# Patient Record
Sex: FEMALE | Race: WHITE
[De-identification: names, ages, dates, MRNs, and addresses within clinical notes are randomized per-mention and may not be internally consistent; named-entity substitution may affect disease eponyms.]

---

## 2017-09-23 ENCOUNTER — HOSPITAL ENCOUNTER (EMERGENCY)
Dept: HOSPITAL 25 - UCCORT | Age: 72
Discharge: LEFT BEFORE DISCHARGE | End: 2017-09-23
Payer: MEDICARE

## 2017-09-23 VITALS — SYSTOLIC BLOOD PRESSURE: 153 MMHG | DIASTOLIC BLOOD PRESSURE: 64 MMHG

## 2017-09-23 DIAGNOSIS — Z99.81: ICD-10-CM

## 2017-09-23 DIAGNOSIS — R06.02: ICD-10-CM

## 2017-09-23 DIAGNOSIS — Z72.0: ICD-10-CM

## 2017-09-23 DIAGNOSIS — J44.9: Primary | ICD-10-CM

## 2017-09-23 DIAGNOSIS — R07.9: ICD-10-CM

## 2017-09-23 PROCEDURE — G0463 HOSPITAL OUTPT CLINIC VISIT: HCPCS

## 2017-09-23 PROCEDURE — 99212 OFFICE O/P EST SF 10 MIN: CPT

## 2017-09-23 NOTE — ED
Shortness of Breath





- HPI Summary


HPI Summary: 





72 yr old female with the chest heaviness and shortness of breath.  Onset of 

symptoms abruptly yesterday.  She states she has had heaviness and feels SOB.  

She does not feel this is like her COPD exacerbation.  the patient denies fever 

or chills.  The patient denies dizziness.  Her symptoms are moderate.  She has 

been wearing oxygen at home and her symptoms are not any better. 





- History of Current Complaint


Chief Complaint: UCRespiratory


Time Seen by Provider: 09/23/17 12:39





- Allergy/Home Medications


Allergies/Adverse Reactions: 


 Allergies











Allergy/AdvReac Type Severity Reaction Status Date / Time


 


Bupropion [From Wellbutrin] Allergy  anaphylaxis Verified 09/23/17 12:38











Home Medications: 


 Home Medications





Oxygen    09/23/17 [History]


Tiotropium CAP.INH* [Spiriva CAP.INH*] 1 cap DAILY 09/23/17 [History Confirmed 

09/23/17]











PMH/Surg Hx/FS Hx/Imm Hx


Previously Healthy: Yes


Respiratory History: Reports: Hx Chronic Obstructive Pulmonary Disease (COPD)





- Cancer History


Hx Chemotherapy: No


Hx Radiation Therapy: No





- Surgical History


Surgery Procedure, Year, and Place: Complete Hysterectomy 1979, Lumbar -1998, 

Lithotripsy 5/28/14


Infectious Disease History: Yes


Infectious Disease History: Reports: Hx Hepatitis - hepatitis C


   Denies: Traveled Outside the [place] in Last 30 Days





- Family History


Known Family History: Positive: Hypertension





- Social History


Alcohol Use: None


Substance Use Type: Reports: None


Hx Tobacco Use: Yes


Smoking Status (MU): Current Some Day Smoker


Type: Cigarettes


Amount Used/How Often: 1 ppd


Have You Smoked in the Last Year: Yes





Review of Systems


Negative: Fever, Chills


Positive: Chest Pain


Positive: Shortness Of Breath


All Other Systems Reviewed And Are Negative: Yes





Physical Exam


Triage Information Reviewed: Yes


Vital Signs On Initial Exam: 


 Initial Vitals











Temp Pulse Resp BP Pulse Ox


 


 99 F   78   26   153/64   98 


 


 09/23/17 12:40  09/23/17 12:40  09/23/17 12:40  09/23/17 12:40  09/23/17 12:40











Vital Signs Reviewed: Yes


Appearance: Positive: Well-Appearing, No Pain Distress


Skin: Positive: Warm, Skin Color Reflects Adequate Perfusion


Eyes: Positive: EOMI, ADAN


ENT: Positive: Normal ENT inspection


Neck: Positive: Supple, Nontender


Respiratory/Lung Sounds: Positive: Clear to Auscultation, Breath Sounds Present


Cardiovascular: Positive: RRR.  Negative: Murmur


Abdomen Description: Positive: Nontender


Musculoskeletal: Positive: Strength/ROM Intact


Neurological: Positive: Normal, Sensory/Motor Intact, Alert, Oriented to Person 

Place, Time, CN Intact II-III


Psychiatric: Positive: Normal





Diagnostics





- Vital Signs


 Vital Signs











  Temp Pulse Resp BP Pulse Ox


 


 09/23/17 12:40  99 F  78  26  153/64  98














- Laboratory


Lab Statement: Any lab studies that have been ordered have been reviewed, and 

results considered in the medical decision making process.





Course/Dx





- Course


Course Of Treatment: 72 yr old with CP and SOB.  She refuses an EKG and states 

she is having someone  her to the hospital and will have everything done 

there.  She says she will not take an ambulance.  She signed out AMA refusing 

ambulance and Dr Bello at [facility] was contacted.





- Diagnoses


Provider Diagnoses: 


 Chest pain, Shortness of breath








Discharge





- Discharge Plan


Condition: Good


Disposition: AGAINST MEDICAL ADVICE

## 2018-05-23 ENCOUNTER — HOSPITAL ENCOUNTER (EMERGENCY)
Dept: HOSPITAL 25 - UCCORT | Age: 73
Discharge: HOME | End: 2018-05-23
Payer: MEDICARE

## 2018-05-23 VITALS — SYSTOLIC BLOOD PRESSURE: 148 MMHG | DIASTOLIC BLOOD PRESSURE: 68 MMHG

## 2018-05-23 DIAGNOSIS — J32.9: Primary | ICD-10-CM

## 2018-05-23 DIAGNOSIS — I10: ICD-10-CM

## 2018-05-23 DIAGNOSIS — J44.9: ICD-10-CM

## 2018-05-23 DIAGNOSIS — F17.210: ICD-10-CM

## 2018-05-23 DIAGNOSIS — Z88.8: ICD-10-CM

## 2018-05-23 PROCEDURE — G0463 HOSPITAL OUTPT CLINIC VISIT: HCPCS

## 2018-05-23 PROCEDURE — 99212 OFFICE O/P EST SF 10 MIN: CPT

## 2018-05-23 NOTE — ED
Throat Pain/Nasal Congestion





- HPI Summary


HPI Summary: 





73yr old with post nasal drip,sinus pressure right frontal and maxillary sinus.

  Onset five days ago.  The sinus drainage is yellow in color and unilateral.  

No fever.  No change in vision, speech, hearing swallowing.  No drooling.   She 

relocated here from [place] not long ago.  She has had prior sinus infections. 





- History of Current Complaint


Chief Complaint: UCGeneralIllness


Time Seen by Provider: 05/23/18 14:56





- Allergies/Home Medications


Allergies/Adverse Reactions: 


 Allergies











Allergy/AdvReac Type Severity Reaction Status Date / Time


 


bupropion [From Wellbutrin] Allergy  Anaphylatic Verified 05/23/18 14:16





   Shock  














PMH/Surg Hx/FS Hx/Imm Hx


Respiratory History: Reports: Hx Chronic Obstructive Pulmonary Disease (COPD)





- Cancer History


Hx Chemotherapy: No


Hx Radiation Therapy: No





- Surgical History


Surgery Procedure, Year, and Place: Complete Hysterectomy 1979, Lumbar -1998, 

Lithotripsy 5/28/14


Infectious Disease History: No


Infectious Disease History: Reports: Hx Hepatitis - hepatitis C


   Denies: Traveled Outside the [place] in Last 30 Days





- Family History


Known Family History: Positive: Hypertension





- Social History


Occupation: Retired


Lives: With Family


Alcohol Use: None


Substance Use Type: Reports: None


Hx Tobacco Use: Yes


Smoking Status (MU): Heavy Every Day Tobacco Smoker


Type: Cigarettes


Amount Used/How Often: 1 ppd


Have You Smoked in the Last Year: Yes





Review of Systems


Constitutional: Negative


Positive: Ear Ache - right ear pressure, Nasal Discharge


Negative: Chest Pain


Positive: Cough.  Negative: Shortness Of Breath


All Other Systems Reviewed And Are Negative: Yes





Physical Exam


Triage Information Reviewed: Yes


Vital Signs On Initial Exam: 


 Initial Vitals











Temp Pulse Resp BP Pulse Ox


 


 99.7 F   94   22   148/68   100 


 


 05/23/18 14:15  05/23/18 14:15  05/23/18 14:15  05/23/18 14:15  05/23/18 14:15











Vital Signs Reviewed: Yes


Appearance: Positive: Well-Appearing, No Pain Distress


Skin: Positive: Warm, Skin Color Reflects Adequate Perfusion


Head/Face: Positive: Normal Head/Face Inspection


Eyes: Positive: EOMI, ADAN


ENT: Positive: Normal ENT inspection, Nasal drainage, TMs normal, Sinus 

tenderness.  Negative: Muffled voice, Hoarse voice


Neck: Positive: Nontender


Respiratory/Lung Sounds: Positive: Clear to Auscultation, Breath Sounds Present


Cardiovascular: Positive: RRR.  Negative: Murmur


Abdomen Description: Positive: Nontender


Musculoskeletal: Positive: Strength/ROM Intact


Neurological: Positive: Sensory/Motor Intact, Alert, Oriented to Person Place, 

Time, CN Intact II-III


Psychiatric: Positive: Normal





- Brice Coma Scale


Best Eye Response: 4 - Spontaneous


Best Motor Response: 6 - Obeys Commands


Best Verbal Response: 5 - Oriented


Coma Scale Total: 15





Diagnostics





- Vital Signs


 Vital Signs











  Temp Pulse Resp BP Pulse Ox


 


 05/23/18 14:15  99.7 F  94  22  148/68  100














- Laboratory


Lab Statement: Any lab studies that have been ordered have been reviewed, and 

results considered in the medical decision making process.





EENT Course/Dx





- Course


Course Of Treatment: 73 yr old female with sinusitis.  Rx with Augmentin. BP 

recheck with PMD





- Diagnoses


Provider Diagnoses: 


 Sinusitis, Hypertension








Discharge





- Sign-Out/Discharge


Documenting (check all that apply): Discharge/Admit/Transfer





- Discharge Plan


Condition: Good


Disposition: HOME


Prescriptions: 


Amoxicillin/Clavulanate TAB* [Augmentin *] 875 mg PO BID #20 tab


Patient Education Materials:  Sinusitis (ED), Hypertension (ED)


Referrals: 


Caesar Aguilar MD [Primary Care Provider] - 4 Days





- Billing Disposition and Condition


Condition: GOOD


Disposition: HOME